# Patient Record
Sex: MALE | Race: BLACK OR AFRICAN AMERICAN | NOT HISPANIC OR LATINO | Employment: PART TIME | URBAN - METROPOLITAN AREA
[De-identification: names, ages, dates, MRNs, and addresses within clinical notes are randomized per-mention and may not be internally consistent; named-entity substitution may affect disease eponyms.]

---

## 2024-11-28 ENCOUNTER — HOSPITAL ENCOUNTER (EMERGENCY)
Facility: HOSPITAL | Age: 19
Discharge: HOME | End: 2024-11-28
Payer: COMMERCIAL

## 2024-11-28 VITALS
OXYGEN SATURATION: 95 % | HEART RATE: 92 BPM | HEIGHT: 70 IN | BODY MASS INDEX: 21.47 KG/M2 | SYSTOLIC BLOOD PRESSURE: 115 MMHG | TEMPERATURE: 97.3 F | DIASTOLIC BLOOD PRESSURE: 73 MMHG | RESPIRATION RATE: 18 BRPM | WEIGHT: 150 LBS

## 2024-11-28 DIAGNOSIS — L02.91 ABSCESS: Primary | ICD-10-CM

## 2024-11-28 PROCEDURE — 10061 I&D ABSCESS COMP/MULTIPLE: CPT

## 2024-11-28 PROCEDURE — 99283 EMERGENCY DEPT VISIT LOW MDM: CPT

## 2024-11-28 PROCEDURE — 10060 I&D ABSCESS SIMPLE/SINGLE: CPT

## 2024-11-28 PROCEDURE — 2500000004 HC RX 250 GENERAL PHARMACY W/ HCPCS (ALT 636 FOR OP/ED)

## 2024-11-28 PROCEDURE — 99283 EMERGENCY DEPT VISIT LOW MDM: CPT | Mod: 25

## 2024-11-28 PROCEDURE — 2500000001 HC RX 250 WO HCPCS SELF ADMINISTERED DRUGS (ALT 637 FOR MEDICARE OP)

## 2024-11-28 RX ORDER — IBUPROFEN 600 MG/1
600 TABLET ORAL EVERY 6 HOURS PRN
Qty: 28 TABLET | Refills: 0 | Status: SHIPPED | OUTPATIENT
Start: 2024-11-28

## 2024-11-28 RX ORDER — CEPHALEXIN 250 MG/1
500 CAPSULE ORAL ONCE
Status: COMPLETED | OUTPATIENT
Start: 2024-11-28 | End: 2024-11-28

## 2024-11-28 RX ORDER — LIDOCAINE HYDROCHLORIDE 10 MG/ML
10 INJECTION, SOLUTION INFILTRATION; PERINEURAL ONCE
Status: COMPLETED | OUTPATIENT
Start: 2024-11-28 | End: 2024-11-28

## 2024-11-28 RX ORDER — IBUPROFEN 600 MG/1
600 TABLET ORAL ONCE
Status: COMPLETED | OUTPATIENT
Start: 2024-11-28 | End: 2024-11-28

## 2024-11-28 RX ORDER — CEPHALEXIN 500 MG/1
500 CAPSULE ORAL 2 TIMES DAILY
Qty: 14 CAPSULE | Refills: 0 | Status: SHIPPED | OUTPATIENT
Start: 2024-11-28 | End: 2024-11-29

## 2024-11-28 ASSESSMENT — COLUMBIA-SUICIDE SEVERITY RATING SCALE - C-SSRS
1. IN THE PAST MONTH, HAVE YOU WISHED YOU WERE DEAD OR WISHED YOU COULD GO TO SLEEP AND NOT WAKE UP?: NO
2. HAVE YOU ACTUALLY HAD ANY THOUGHTS OF KILLING YOURSELF?: NO
6. HAVE YOU EVER DONE ANYTHING, STARTED TO DO ANYTHING, OR PREPARED TO DO ANYTHING TO END YOUR LIFE?: NO

## 2024-11-28 ASSESSMENT — PAIN SCALES - GENERAL: PAINLEVEL_OUTOF10: 10 - WORST POSSIBLE PAIN

## 2024-11-28 ASSESSMENT — PAIN - FUNCTIONAL ASSESSMENT: PAIN_FUNCTIONAL_ASSESSMENT: 0-10

## 2024-11-28 NOTE — DISCHARGE INSTRUCTIONS
Keep wound clean and dry.  Packing was placed inside the wound, leave the packing in for 1 week, it should fall out naturally.  Do not apply any deodorant, lotions, creams, soaps or oils and avoid shaving.  Follow-up with the surgery clinic at [phone].  Take the antibiotic prescribed and complete entire course.  May take ibuprofen as needed for discomfort.  Monitor for signs and symptoms of infection including redness, swelling, warmth.  If the wound begins to drain significant amounts of purulent drainage, return to the emergency department.

## 2024-11-28 NOTE — Clinical Note
Capo Hua was seen and treated in our emergency department on 11/28/2024.  He may return to work on 11/30/2024.       If you have any questions or concerns, please don't hesitate to call.      Yelitza Hoffman, APRN-CNP

## 2024-11-28 NOTE — ED PROVIDER NOTES
History of Present Illness     History provided by: Patient  Limitations to History: None  External Records Reviewed with Brief Summary: None    HPI:  Capo Hua is a 19 y.o. male who presents to the emergency department for concerns of abscess to the left underarm.  Patient states noticed a small painful bump 2 months ago, was evaluated at a local urgent care, and placed on 14-day course of antibiotics in which he completed.  States swelling and pain progressed, seen at the same local urgent care was told to apply warm compresses.  Patient states over the last week and a half had increased swelling.  He denies history of diabetes, hidradenitis, fevers, IVDU, chest pain or SOB.    Physical Exam   Triage vitals:  T 36.3 °C (97.3 °F)  HR 92  /73  RR 18  O2 95 %      Physical Exam  Vitals and nursing note reviewed.   Constitutional:       General: He is not in acute distress.     Appearance: He is not ill-appearing or toxic-appearing.   HENT:      Head: Normocephalic and atraumatic.      Right Ear: External ear normal.      Left Ear: External ear normal.      Nose: Nose normal.      Mouth/Throat:      Mouth: Mucous membranes are moist.      Pharynx: Oropharynx is clear.   Eyes:      Extraocular Movements: Extraocular movements intact.   Cardiovascular:      Rate and Rhythm: Normal rate and regular rhythm.   Pulmonary:      Effort: Pulmonary effort is normal.   Abdominal:      General: Abdomen is flat.      Palpations: Abdomen is soft.   Musculoskeletal:      Cervical back: Normal range of motion and neck supple.   Skin:     Findings: Abscess present.      Comments: There is a an abscess to the left axillary, it is fluctuant, erythematous, with surrounding cellulitis.   Neurological:      General: No focal deficit present.      Mental Status: He is alert and oriented to person, place, and time.   Psychiatric:         Mood and Affect: Mood normal.         Behavior: Behavior normal.         Medical  Decision Making & ED Course   Medical Decision Makin y.o. male who presents to the emergency department for concerns of abscess to the left underarm.  Patient states noticed a small painful bump 2 months ago, was evaluated at a local urgent care, and placed on 14-day course of antibiotics in which he completed.  States swelling and pain progressed, seen at the same local urgent care was told to apply warm compresses.  Patient states over the last week and a half had increased swelling.  He denies history of diabetes, hidradenitis, fevers, IVDU, chest pain or SOB.    The physical exam is remarkable for abscess to Left axillary measuring 4.0 cm. it is fluctuant, erythematous, with surrounding cellulitis.    Based on history and physical exam findings- suggestive of abscess with localized skin infection. No evidence of deep tissue infection, foreign body, or sepsis.    Differential Diagnosis: Is extensive but includes abscess with surrounding cellulitis, lymphangitis, osteomyelitis, necrotizing fasciitis, etc.    The abscess was I&D, see procedure note    Patient will be discharged with ibuprofen and cephalexin.  Patient instructed on wound care, educated to monitor for signs and symptoms of infection.  Patient encouraged to continue supportive care measures.  Patient educated to follow-up with the surgery's clinic in 1 week.      ED Course:  Diagnoses as of 24 1648   Abscess     Disposition   Discharge    Procedures   Incision and Drainage    Performed by: BRIANA Kent  Authorized by: BRIANA Kent    Consent:     Consent obtained:  Verbal    Consent given by:  Patient    Risks, benefits, and alternatives were discussed: yes      Risks discussed:  Bleeding, incomplete drainage, pain and infection    Alternatives discussed:  No treatment and delayed treatment  Universal protocol:     Procedure explained and questions answered to patient or proxy's satisfaction: yes      Site/side marked:  yes      Immediately prior to procedure, a time out was called: yes      Patient identity confirmed:  Verbally with patient  Location:     Type:  Abscess    Size:  4.0    Location:  Trunk    Trunk location: Left Axillary.  Pre-procedure details:     Skin preparation:  Povidone-iodine  Sedation:     Sedation type:  None  Anesthesia:     Anesthesia method:  Local infiltration    Local anesthetic:  Lidocaine 1% w/o epi  Procedure details:     Incision types:  Single straight    Wound management:  Probed and deloculated, irrigated with saline and extensive cleaning    Drainage:  Bloody and purulent    Drainage amount:  Copious    Wound treatment:  Wound left open    Packing material: 1 cm idoform guaze.  Post-procedure details:     Procedure completion:  Tolerated well, no immediate complications  Comments:       The site was anesthetized with 1% lidocaine. A linear incision along the local skin lines was made and the purulent material expressed. The abscess was explored thoroughly and sequestered pockets were opened. Moderate pus removed. Bleeding was minimal. No packing required. Irrigated with normal saline and nonadherent dressing applied. The patient tolerated the procedure well without complications. Standard post-procedure care is explained and return precautions are given.        Patient was seen independently    BRIANA Kent  Emergency Medicine     BRIANA Kent  11/28/24 [id number]

## 2024-11-29 ENCOUNTER — HOSPITAL ENCOUNTER (EMERGENCY)
Facility: HOSPITAL | Age: 19
Discharge: HOME | End: 2024-11-29
Attending: EMERGENCY MEDICINE
Payer: COMMERCIAL

## 2024-11-29 VITALS
BODY MASS INDEX: 21.47 KG/M2 | TEMPERATURE: 97.3 F | OXYGEN SATURATION: 95 % | DIASTOLIC BLOOD PRESSURE: 76 MMHG | HEART RATE: 73 BPM | RESPIRATION RATE: 16 BRPM | WEIGHT: 150 LBS | HEIGHT: 70 IN | SYSTOLIC BLOOD PRESSURE: 129 MMHG

## 2024-11-29 DIAGNOSIS — L02.91 ABSCESS: Primary | ICD-10-CM

## 2024-11-29 DIAGNOSIS — Z51.89 VISIT FOR WOUND CARE: ICD-10-CM

## 2024-11-29 PROCEDURE — 99283 EMERGENCY DEPT VISIT LOW MDM: CPT | Performed by: EMERGENCY MEDICINE

## 2024-11-29 PROCEDURE — 99283 EMERGENCY DEPT VISIT LOW MDM: CPT

## 2024-11-29 RX ORDER — CEPHALEXIN 500 MG/1
500 CAPSULE ORAL 2 TIMES DAILY
Qty: 14 CAPSULE | Refills: 0 | Status: SHIPPED | OUTPATIENT
Start: 2024-11-29 | End: 2024-12-06

## 2024-11-29 ASSESSMENT — LIFESTYLE VARIABLES
HAVE YOU EVER FELT YOU SHOULD CUT DOWN ON YOUR DRINKING: NO
TOTAL SCORE: 0
HAVE PEOPLE ANNOYED YOU BY CRITICIZING YOUR DRINKING: NO
EVER FELT BAD OR GUILTY ABOUT YOUR DRINKING: NO
EVER HAD A DRINK FIRST THING IN THE MORNING TO STEADY YOUR NERVES TO GET RID OF A HANGOVER: NO

## 2024-11-29 ASSESSMENT — PAIN SCALES - GENERAL: PAINLEVEL_OUTOF10: 0 - NO PAIN

## 2024-11-29 ASSESSMENT — COLUMBIA-SUICIDE SEVERITY RATING SCALE - C-SSRS
6. HAVE YOU EVER DONE ANYTHING, STARTED TO DO ANYTHING, OR PREPARED TO DO ANYTHING TO END YOUR LIFE?: NO
2. HAVE YOU ACTUALLY HAD ANY THOUGHTS OF KILLING YOURSELF?: NO
1. IN THE PAST MONTH, HAVE YOU WISHED YOU WERE DEAD OR WISHED YOU COULD GO TO SLEEP AND NOT WAKE UP?: NO

## 2024-11-29 ASSESSMENT — PAIN - FUNCTIONAL ASSESSMENT: PAIN_FUNCTIONAL_ASSESSMENT: 0-10

## 2024-11-29 NOTE — ED TRIAGE NOTES
Pt presents to ED with abscess. Pt states he was here yesterday, got abscess under L arm drained, and has bandage under L arm. Pt denies pain, states yellow drainage coming from site. Pt here to have abscess re-dressed.

## 2024-11-29 NOTE — ED PROVIDER NOTES
History of Present Illness     History provided by: Patient  Limitations to History: None  External Records Reviewed with Brief Summary:  ED notes    HPI:  Capo Hua is a 19 y.o. male who presents to the emergency department for concerns of abscess to the left underarm.  Patient states that he was here yesterday where he was seen by a provider  in the ED. Patient had his abscess drained on the left arm and a lot of pus and blood came out of the area.  Patient is not having any pain to the area, there is some induration, no erythema, no pustule drainage that can be seen at the site.  Patient is here simply because he was trying to set up an appointment with wound care and had to present in person given that he has had some difficulties with his insurance.  Patient then decided that he would come here to the emergency room to have them redress his wound.  Patient is not complaining of any other fevers, chills, nausea, vomiting, abdominal pain.  Patient states that he feels well and is only here to get area redraped.  States that he does not exactly know where his prescription for his antibiotics has been sent to.  On chart review, it appears that they printed off prescription.     Physical Exam   Triage vitals:  T 36.3 °C (97.3 °F)  HR 73  /76  RR 16  O2 95 % None (Room air)    General: Awake, alert, in no acute distress  Eyes: Gaze conjugate.  No scleral icterus or injection  HENT: Normo-cephalic, atraumatic. No stridor  CV: Regular rate, regular rhythm. Radial pulses 2+ bilaterally  Resp: Breathing non-labored, speaking in full sentences.  Clear to auscultation bilaterally  GI: Soft, non-distended, non-tender. No rebound or guarding.  MSK/Extremities: No gross bony deformities. Moving all extremities  Skin: Warm. Appropriate color, small abscess under the left underarm, no erythema, slight induration, no fluctuance, no tenderness palpation, draining clear fluid.  No pustule drainage seen at the  site.  Neuro: Alert. Oriented. Face symmetric. Speech is fluent.  Gross strength and sensation intact in b/l UE and LEs  Psych: Appropriate mood and affect    Medical Decision Making & ED Course   Medical Decision Makin y.o. male who presents to the emergency department for concerns of abscess to the left underarm.  On exam, there is slight induration under the left underarm, no erythema, no fluctuance and only with minimal watery drainage.  Patient states that he was seen yesterday in the emergency room where they did an incision and drainage and got out a lot of pus.  Patient states that he was given cephalexin as a prescription however has not been able to  the medication.  Patient is here only to get the area rewrapped.  Patient was here because he had to schedule an appointment with wound care in person.  Patient vitally stable on presentation to the emergency room.  Given the patient states that his antibiotics were sent to a [facility] or [facility] and in the records it looks like he got a printed off copy, I reprinted the antibiotics prescription and gave it to him with strict instructions to go to the pharmacy and fill it.  Patient was discharged from the urgency room, stable.  ----  Scoring Tools Utilized: None       Social Determinants of Health which Significantly Impact Care: None identified The following actions were taken to address these social determinants: None    EKG Independent Interpretation: EKG not obtained    Independent Result Review and Interpretation: None obtained    Chronic conditions affecting the patient's care: As documented above in [facility]    The patient was discussed with the following consultants/services: None    Care Considerations: As documented above in [facility]    ED Course:  Diagnoses as of 24 1713   Abscess   Visit for wound care     Disposition   As a result of the work-up, the patient was discharged home.  he was informed of his diagnosis and instructed to come back  with any concerns or worsening of condition.  he and was agreeable to the plan as discussed above.  he was given the opportunity to ask questions.  All of the patient's questions were answered.    Procedures   Procedures    Patient seen and discussed with ED attending physician.    Brandi Vick MD  Emergency Medicine     Brandi Vick MD  Resident  11/29/24 [id number]